# Patient Record
Sex: FEMALE | Race: WHITE | Employment: UNEMPLOYED | URBAN - NONMETROPOLITAN AREA
[De-identification: names, ages, dates, MRNs, and addresses within clinical notes are randomized per-mention and may not be internally consistent; named-entity substitution may affect disease eponyms.]

---

## 2020-03-30 ENCOUNTER — APPOINTMENT (OUTPATIENT)
Dept: GENERAL RADIOLOGY | Age: 35
End: 2020-03-30
Payer: MEDICAID

## 2020-03-30 ENCOUNTER — HOSPITAL ENCOUNTER (EMERGENCY)
Age: 35
Discharge: HOME | End: 2020-03-30
Attending: EMERGENCY MEDICINE
Payer: MEDICAID

## 2020-03-30 VITALS
BODY MASS INDEX: 20.4 KG/M2 | HEART RATE: 72 BPM | HEIGHT: 67 IN | DIASTOLIC BLOOD PRESSURE: 97 MMHG | TEMPERATURE: 98.7 F | WEIGHT: 130 LBS | OXYGEN SATURATION: 100 % | SYSTOLIC BLOOD PRESSURE: 131 MMHG

## 2020-03-30 PROCEDURE — 2709999900 HC NON-CHARGEABLE SUPPLY

## 2020-03-30 PROCEDURE — 73030 X-RAY EXAM OF SHOULDER: CPT

## 2020-03-30 PROCEDURE — 99284 EMERGENCY DEPT VISIT MOD MDM: CPT

## 2020-03-30 RX ORDER — HYDROCODONE BITARTRATE AND ACETAMINOPHEN 5; 325 MG/1; MG/1
1 TABLET ORAL EVERY 6 HOURS PRN
Qty: 10 TABLET | Refills: 0 | Status: SHIPPED | OUTPATIENT
Start: 2020-03-30 | End: 2020-04-04

## 2020-03-30 SDOH — HEALTH STABILITY: MENTAL HEALTH: HOW OFTEN DO YOU HAVE A DRINK CONTAINING ALCOHOL?: NEVER

## 2020-03-30 ASSESSMENT — PAIN DESCRIPTION - LOCATION: LOCATION: SHOULDER

## 2020-03-30 ASSESSMENT — PAIN SCALES - GENERAL: PAINLEVEL_OUTOF10: 10

## 2020-03-30 NOTE — ED NOTES
Discharge instructions reviewed with patient. Copy of instructions, along with prescription given to patient. Sling placed on patient prior to departure.         Derrek Mcneal RN  03/30/20 [id number]

## 2020-04-21 ENCOUNTER — HOSPITAL ENCOUNTER (OUTPATIENT)
Age: 35
Discharge: HOME | End: 2020-04-21
Payer: MEDICAID

## 2020-04-21 LAB
T4 FREE: 1.59 NG/DL (ref 0.93–1.76)
TSH SERPL DL<=0.05 MIU/L-ACNC: 1.63 UIU/ML (ref 0.4–4.2)

## 2020-04-21 PROCEDURE — 84443 ASSAY THYROID STIM HORMONE: CPT

## 2020-04-21 PROCEDURE — 84439 ASSAY OF FREE THYROXINE: CPT

## 2020-04-21 PROCEDURE — 36415 COLL VENOUS BLD VENIPUNCTURE: CPT

## 2020-04-27 ENCOUNTER — HOSPITAL ENCOUNTER (OUTPATIENT)
Dept: ULTRASOUND IMAGING | Age: 35
Discharge: HOME | End: 2020-04-27
Payer: MEDICAID

## 2020-04-27 ENCOUNTER — HOSPITAL ENCOUNTER (OUTPATIENT)
Dept: OCCUPATIONAL THERAPY | Age: 35
Setting detail: THERAPIES SERIES
Discharge: HOME | End: 2020-04-27
Payer: MEDICAID

## 2020-04-27 PROCEDURE — 76536 US EXAM OF HEAD AND NECK: CPT

## 2020-04-27 PROCEDURE — 97165 OT EVAL LOW COMPLEX 30 MIN: CPT

## 2020-04-27 ASSESSMENT — PAIN DESCRIPTION - ORIENTATION: ORIENTATION: RIGHT

## 2020-04-27 ASSESSMENT — PAIN DESCRIPTION - DESCRIPTORS: DESCRIPTORS: ACHING

## 2020-04-27 ASSESSMENT — PAIN DESCRIPTION - LOCATION: LOCATION: SHOULDER

## 2020-04-27 ASSESSMENT — PAIN SCALES - GENERAL: PAINLEVEL_OUTOF10: 8

## 2020-04-27 NOTE — FLOWSHEET NOTE
Status: Impaired  Additional Comments: Reports that she has some tingling in her right fingers at times when she is laying down. Hand Dominance: Right                    RUE PROM (degrees)  RUE PROM: Exceptions  R Shoulder Flex  0-180: 85  R Shoulder ABduction 0-180: 74  R Shoulder Ext Rotation  0-90: 65 at side  RUE AROM (degrees)  RUE AROM : Exceptions  R Shoulder Flexion 0-180: 62  R Shoulder ABduction 0-180: 56  R Shoulder Int Rotation  0-70: can get right thumb 6\" above waistband behind back  R Shoulder Ext Rotation 0-90: 55 at side                                                                                                      ADL  Additional Comments: Reports that she wakes up more times at night than she can count due to right shoulder pain. Relates extreme difficulty with dressing, hair care, household activities. Relates that she is not able to do laundry due to right shoulder pain and discomfort. Activity Tolerance: Additional Comments: Tolerated evaluation well     Assessment:  Performance deficits / Impairments: Decreased ADL status, Decreased ROM, Decreased strength, Decreased high-level IADLs  Assessment: Patient meets criteria for low complexity evaluation based on documented evaluation findings. Patient requires skilled therapy services to address ROM, functional strength, and functional use of right UE in her daily tasks to increase her ability to complete her normal daily tasks and work tasks. Prognosis: Good  Clinical Decision Making: Clinical Decision making was of Low Complexity as the result of analysis of data from a problem focused assessment, a consideration of a limited number of treatment options, no significant comorbidities affecting the plan of care and no modification or assistance required to complete the evaluation.     Patient Education:  Patient Education: OT POC, HEP - scapular retraction, backward shoulder Complexity: Based on the findings of patient history, examination, clinical presentation, and decision making during this evaluation, this patient is of low complexity.     Cody Coronado, OTR/L #[id number]

## 2020-05-01 ENCOUNTER — HOSPITAL ENCOUNTER (OUTPATIENT)
Dept: OCCUPATIONAL THERAPY | Age: 35
Setting detail: THERAPIES SERIES
Discharge: HOME | End: 2020-05-01
Payer: MEDICARE

## 2020-05-01 PROCEDURE — 97110 THERAPEUTIC EXERCISES: CPT

## 2020-05-01 ASSESSMENT — PAIN SCALES - GENERAL: PAINLEVEL_OUTOF10: 5

## 2020-05-01 ASSESSMENT — PAIN DESCRIPTION - LOCATION: LOCATION: SHOULDER

## 2020-05-01 ASSESSMENT — PAIN DESCRIPTION - ORIENTATION: ORIENTATION: RIGHT

## 2020-05-01 NOTE — PROGRESS NOTES
Plan:  Plan Comment: Continue per established POC  Specific instructions for Next Treatment: PROM, PAULO OLSON, OTR/L #[id number]

## 2020-05-04 ENCOUNTER — HOSPITAL ENCOUNTER (OUTPATIENT)
Dept: OCCUPATIONAL THERAPY | Age: 35
Setting detail: THERAPIES SERIES
Discharge: HOME | End: 2020-05-04
Payer: MEDICARE

## 2020-05-04 PROCEDURE — 97110 THERAPEUTIC EXERCISES: CPT

## 2020-05-04 ASSESSMENT — PAIN DESCRIPTION - LOCATION: LOCATION: SHOULDER

## 2020-05-04 ASSESSMENT — PAIN DESCRIPTION - ORIENTATION: ORIENTATION: RIGHT

## 2020-05-04 ASSESSMENT — PAIN SCALES - GENERAL: PAINLEVEL_OUTOF10: 5

## 2020-05-06 ENCOUNTER — HOSPITAL ENCOUNTER (OUTPATIENT)
Dept: OCCUPATIONAL THERAPY | Age: 35
Setting detail: THERAPIES SERIES
Discharge: HOME | End: 2020-05-06
Payer: MEDICARE

## 2020-05-06 PROCEDURE — 97110 THERAPEUTIC EXERCISES: CPT

## 2020-05-06 ASSESSMENT — PAIN SCALES - GENERAL: PAINLEVEL_OUTOF10: 5

## 2020-05-06 NOTE — PROGRESS NOTES
[facility]  OUTPATIENT OCCUPATIONAL THERAPY  Daily Note  [facility]    Time In: 1000  Time Out: 1030  Minutes: 30  Timed Code Treatment Minutes: 30 Minutes                Date: 2020  Patient Name: Laura Holliday        CSN: [id number]   : 1985  (36 y.o.)  Gender: female   Referring Practitioner: Dr. Kiley Pierce  Diagnosis: Unspecified dislocation of right acromioclavicular joint S43.101D          General:  OT Visit Information  Onset Date: 20  OT Insurance Information: [facility] Medicaid (starting 20); 30 visits per calendar year  Total # of Visits Approved: 31  Total # of Visits to Date: 4  Certification Period Expiration Date: 20  Progress Note Counter: 4/10 for PN  Comments: returns to physician         Restrictions/Precautions:       Position Activity Restriction  Other position/activity restrictions: anxiety; was told to not do any lifting with right UE         Subjective:  Subjective: States that her shoulder is \"pretty sore\" today. States that she thinks that the mobility in her arm is getting better. Pain:  Patient Currently in Pain: Yes  Pain Assessment: 0-10  Pain Level: 5(5/10 in right shoulder; 6/10 in right scapular area)       Objective:     Upper Extremity Function  UE AROM: scapular retraction and backward shoulder circles x 15 reps each  UE PROM: pulleys for shoulder flexion x 20 reps; supine PROM to right shoulder in all planes to patient tolerance  UE AAROM: seated saeboglide for right shoulder flexion x 20 reps, shoulder scaption x 20 reps; supine bilateral shoulder flexion x 10 reps  UE Stretching: STM to right rhomboid region with tightness present                             Additional Activities Comment  Additional Activities: Rock tape applied to right rhomboid region - 2 X strips placed across each other for rhomoid inhibition                  Activity Tolerance: Additional Comments:  Tolerated treatment

## 2020-05-11 ENCOUNTER — HOSPITAL ENCOUNTER (OUTPATIENT)
Dept: OCCUPATIONAL THERAPY | Age: 35
Setting detail: THERAPIES SERIES
Discharge: HOME | End: 2020-05-11
Payer: MEDICARE

## 2020-05-11 PROCEDURE — 97110 THERAPEUTIC EXERCISES: CPT

## 2020-05-11 ASSESSMENT — PAIN DESCRIPTION - LOCATION: LOCATION: SHOULDER

## 2020-05-11 ASSESSMENT — PAIN DESCRIPTION - ORIENTATION: ORIENTATION: RIGHT

## 2020-05-11 ASSESSMENT — PAIN SCALES - GENERAL: PAINLEVEL_OUTOF10: 4

## 2020-05-11 NOTE — PROGRESS NOTES
X strips placed across each other for rhomoid inhibition; Y strip put on upper trapezius for muscle inhibition                  Activity Tolerance: Additional Comments:  Tolerated treatment well    Assessment:  Assessment: Progressing toward goals    Patient Education:  Patient Education: discussed with patient to speak with physician about how physical her job duties are            Plan:  Plan Comment: Continue per established POC  Specific instructions for Next Treatment: PROM, AROM, PAULO Mccauley, OTR/L #[id number]

## 2020-05-13 ENCOUNTER — HOSPITAL ENCOUNTER (OUTPATIENT)
Dept: OCCUPATIONAL THERAPY | Age: 35
Setting detail: THERAPIES SERIES
Discharge: HOME | End: 2020-05-13
Payer: MEDICARE

## 2020-05-13 PROCEDURE — 97110 THERAPEUTIC EXERCISES: CPT

## 2020-05-13 ASSESSMENT — PAIN DESCRIPTION - ORIENTATION: ORIENTATION: RIGHT

## 2020-05-13 ASSESSMENT — PAIN SCALES - GENERAL: PAINLEVEL_OUTOF10: 4

## 2020-05-13 ASSESSMENT — PAIN DESCRIPTION - LOCATION: LOCATION: SHOULDER

## 2020-05-18 ENCOUNTER — HOSPITAL ENCOUNTER (OUTPATIENT)
Dept: OCCUPATIONAL THERAPY | Age: 35
Setting detail: THERAPIES SERIES
Discharge: HOME | End: 2020-05-18
Payer: MEDICARE

## 2020-05-18 PROCEDURE — 97110 THERAPEUTIC EXERCISES: CPT

## 2020-05-18 ASSESSMENT — PAIN SCALES - GENERAL: PAINLEVEL_OUTOF10: 4

## 2020-05-18 ASSESSMENT — PAIN DESCRIPTION - LOCATION: LOCATION: SHOULDER

## 2020-05-18 ASSESSMENT — PAIN DESCRIPTION - ORIENTATION: ORIENTATION: RIGHT

## 2020-05-18 NOTE — PROGRESS NOTES
[facility]  OUTPATIENT OCCUPATIONAL THERAPY  Daily Note  [facility]    Time In: 336  Time Out: 1640  Minutes: 55  Timed Code Treatment Minutes: 40 Minutes                Date: 2020  Patient Name: Gloria Gómez        CSN: [id number]   : 1985  (36 y.o.)  Gender: female   Referring Practitioner: Dr. Agustin Griffith  Diagnosis: Unspecified dislocation of right acromioclavicular joint S43.101D          General:  OT Visit Information  Onset Date: 20  OT Insurance Information: [facility] Medicaid (starting 20); 30 visits per calendar year  Total # of Visits Approved: 31  Total # of Visits to Date: 7  Certification Period Expiration Date: 20  Progress Note Counter: 7/10 for PN  Comments: returns to physician        Restrictions/Precautions:       Position Activity Restriction  Other position/activity restrictions: anxiety; no lifting more than 10#          Subjective:  Subjective: Reports that she has had a migraine for the past 3 days, and will try to make it for 45 minutes today. Reported that she was not sure if she could tolerate today. Reports increased pain with performance of her HEP.             Pain:  Patient Currently in Pain: Yes  Pain Assessment: 0-10(10 for migraine)  Pain Level: 4  Pain Location: Shoulder  Pain Orientation: Right       Objective:     Upper Extremity Function  UE AROM: sidelying scapular depression 10 reps - cues to avoid compensation   UE PROM: supine PROM all planes with tightness in IR only  UE Stretching: STM to right upper trapezius region with high amount of tightness present; sidelying scapular depression stretch; lying in supine over towel roll with over pressure from therapist to decrease pec tightness with increased IR noted after  UE Strengthing: orange band extension with cues for technique to avoid compensation, cues for ER and IR theraband exercises         Additional Activities Comment  Additional Activities: Usentric tape applied to right rhomboid region - Y strip put on upper trapezius for muscle inhibition; donuts over AC joint to decrease pain reported       Moist Heat  Number Minutes Moist Heat: MHP to the right upper trap to decrease great tightness and pain noted          Activity Tolerance: Additional Comments: Tolerated treatment well    Assessment:  Assessment: Progressing toward goals. Reported decreased pain at the end of the session.       Patient Education:  Patient Education: upper trap stretch, pec stretch over towel roll, sidelying depression prior to theraband to activate correct scapular muscles            Plan:  Current Treatment Recommendations: Strengthening, ROM, Self-Care / ADL, Patient/Caregiver Education & Training  Plan Comment: Continue per established POC  Specific instructions for Next Treatment: PROM, AROM, AAROM    Del Arreaga, MOT, OTR/L [id number]

## 2020-05-20 ENCOUNTER — HOSPITAL ENCOUNTER (OUTPATIENT)
Dept: OCCUPATIONAL THERAPY | Age: 35
Setting detail: THERAPIES SERIES
Discharge: HOME | End: 2020-05-20
Payer: MEDICARE

## 2020-05-20 PROCEDURE — 97110 THERAPEUTIC EXERCISES: CPT

## 2020-05-20 ASSESSMENT — PAIN DESCRIPTION - ORIENTATION: ORIENTATION: RIGHT

## 2020-05-20 ASSESSMENT — PAIN DESCRIPTION - LOCATION: LOCATION: SHOULDER

## 2020-05-20 ASSESSMENT — PAIN SCALES - GENERAL: PAINLEVEL_OUTOF10: 4

## 2020-05-27 ENCOUNTER — HOSPITAL ENCOUNTER (OUTPATIENT)
Dept: OCCUPATIONAL THERAPY | Age: 35
Setting detail: THERAPIES SERIES
Discharge: HOME | End: 2020-05-27
Payer: MEDICARE

## 2020-05-27 PROCEDURE — 97110 THERAPEUTIC EXERCISES: CPT

## 2020-05-27 ASSESSMENT — PAIN DESCRIPTION - ORIENTATION: ORIENTATION: RIGHT

## 2020-05-27 ASSESSMENT — PAIN DESCRIPTION - LOCATION: LOCATION: SHOULDER

## 2020-05-27 NOTE — PROGRESS NOTES
theraband for HEP            Plan:  Plan Comment: Continue per established POC  Specific instructions for Next Treatment: AROM, joseph Dueñas, OTR/L #[id number]

## 2020-05-29 ENCOUNTER — HOSPITAL ENCOUNTER (OUTPATIENT)
Dept: OCCUPATIONAL THERAPY | Age: 35
Setting detail: THERAPIES SERIES
Discharge: HOME | End: 2020-05-29
Payer: MEDICARE

## 2020-05-29 PROCEDURE — 97110 THERAPEUTIC EXERCISES: CPT

## 2020-05-29 ASSESSMENT — PAIN SCALES - GENERAL: PAINLEVEL_OUTOF10: 2

## 2020-05-29 ASSESSMENT — PAIN DESCRIPTION - ORIENTATION: ORIENTATION: RIGHT

## 2020-05-29 ASSESSMENT — PAIN DESCRIPTION - LOCATION: LOCATION: SHOULDER

## 2020-06-01 ENCOUNTER — HOSPITAL ENCOUNTER (OUTPATIENT)
Dept: OCCUPATIONAL THERAPY | Age: 35
Setting detail: THERAPIES SERIES
Discharge: HOME | End: 2020-06-01
Payer: MEDICARE

## 2020-06-01 PROCEDURE — 97110 THERAPEUTIC EXERCISES: CPT

## 2020-06-01 ASSESSMENT — PAIN SCALES - GENERAL: PAINLEVEL_OUTOF10: 3

## 2020-06-01 ASSESSMENT — PAIN DESCRIPTION - ORIENTATION: ORIENTATION: RIGHT

## 2020-06-01 ASSESSMENT — PAIN DESCRIPTION - LOCATION: LOCATION: SHOULDER

## 2020-06-01 NOTE — PROGRESS NOTES
[address] [address]  OUTPATIENT OCCUPATIONAL THERAPY  Daily Note  [facility]    Time In: 1500  Time Out: 1535  Minutes: 35  Timed Code Treatment Minutes: 35 Minutes                Date: 2020  Patient Name: Serene Castro        CSN: [id number]   : 1985  (36 y.o.)  Gender: female   Referring Practitioner: Dr. Yong Goldberg  Diagnosis: Unspecified dislocation of right acromioclavicular joint S43.101D          General:  OT Visit Information  Onset Date: 20  OT Insurance Information: [employer] Medicaid (starting 20); 30 visits per calendar year  Total # of Visits Approved: 31  Total # of Visits to Date: 6  Certification Period Expiration Date: 20  Progress Note Counter: PN completed on , 1/10 for PN  Comments: returns to physician        Restrictions/Precautions:       Position Activity Restriction  Other position/activity restrictions: anxiety; no lifting more than 10#          Subjective:  Subjective: Reports that she is sore on the front of the shoulder. Pain:  Patient Currently in Pain: Yes(2/10 at time of therapy; 4/10 at highest in last couple of days and states that it was at the end of the day and she was doing HEP)  Pain Assessment: 0-10  Pain Level: 3  Pain Location: Shoulder  Pain Orientation: Right       Objective:     Upper Extremity Function  UE PROM: supine PROM with tightness with IR - ROM for IR increased after pec and sleeper stretch  UE Stretching: sleeper stretch and pec minor stretch with increased IR and decreased pain anterior shoulder; pulleys to warm up  UE Strengthing: Hipui at 60 speed x 3 minutes forward and 3 minutes backward; prone hughstons with right shoulder (no weight) - horizontal abduction with palm down and thumb up x 10 reps each, scaption with palm down and thumb up x 10 reps up   Decreased height of movement with prone scaption thumb up to avoid pain. Activity Tolerance:  Tolerated treatment well, no pain

## 2020-06-05 ENCOUNTER — HOSPITAL ENCOUNTER (OUTPATIENT)
Dept: OCCUPATIONAL THERAPY | Age: 35
Setting detail: THERAPIES SERIES
End: 2020-06-05
Payer: MEDICARE

## 2020-06-08 ENCOUNTER — HOSPITAL ENCOUNTER (OUTPATIENT)
Dept: OCCUPATIONAL THERAPY | Age: 35
Setting detail: THERAPIES SERIES
Discharge: HOME | End: 2020-06-08
Payer: MEDICARE

## 2020-06-08 PROCEDURE — 97110 THERAPEUTIC EXERCISES: CPT

## 2020-06-08 ASSESSMENT — PAIN SCALES - GENERAL: PAINLEVEL_OUTOF10: 2

## 2020-06-08 ASSESSMENT — PAIN DESCRIPTION - LOCATION: LOCATION: SHOULDER

## 2020-06-08 ASSESSMENT — PAIN DESCRIPTION - ORIENTATION: ORIENTATION: RIGHT

## 2020-06-08 NOTE — PROGRESS NOTES
[address] [address]  OUTPATIENT OCCUPATIONAL THERAPY  Daily Note  [facility]    Time In: 6985  Time Out: 1600  Minutes: 30  Timed Code Treatment Minutes: 30 Minutes                Date: 2020  Patient Name: Zachary Zacarias        CSN: [id number]   : 1985  (36 y.o.)  Gender: female   Referring Practitioner: Dr. René Vázquez  Diagnosis: Unspecified dislocation of right acromioclavicular joint S43.101D          General:  OT Visit Information  Onset Date: 20  OT Insurance Information: [employer] (starting 20); 30 visits per calendar year  Total # of Visits Approved: 31  Total # of Visits to Date: 12  Certification Period Expiration Date: 20  Progress Note Counter: PN completed on , 2/10 for PN  Comments: returns to physician        Restrictions/Precautions:       Position Activity Restriction  Other position/activity restrictions: anxiety; no lifting more than 10#          Subjective:  Subjective: States that she does feel as though she has made quite a progress, but she doesn't think that she is strong enough to return to her normal work duties. Pain:  Patient Currently in Pain: Yes(2/10 at time of therapy; 4/10 at highest in last couple of days and states that it was at the end of the day and she was doing HEP)  Pain Assessment: 0-10  Pain Level: 2  Pain Location: Shoulder  Pain Orientation: Right       Objective:     Upper Extremity Function  UE Strengthing: lime green theraband- right shoulder horizontal abduction/adduction x 15 reps, overhead throw x 15 reps, right shoulder abduction with band under foot x 15 reps; blue theraband - seated riivalid x 15 reps in each direction, fanny x 15 reps with right UE; biodex at 50 speed x 3 minutes forward and 3 minutes backward                                                Activity Tolerance: Additional Comments: Tolerated treatment well    Assessment:  Assessment: Progressing toward goals nicely. Strength continues to be concern for patient to return to work.     Patient Education:  Patient Education: to use blue theraband for fanny and riivalid; lime green theraband - overhead, horizontal abduction/adduction, shoulder abduction; to use 0.5# weight with hughston exercises            Plan:  Plan Comment: Continue with current POC  Specific instructions for Next Treatment: strengthening                   Mckayla Frias, OTR/L #[id number]

## 2020-06-10 ENCOUNTER — HOSPITAL ENCOUNTER (OUTPATIENT)
Dept: OCCUPATIONAL THERAPY | Age: 35
Setting detail: THERAPIES SERIES
Discharge: HOME | End: 2020-06-10
Payer: MEDICARE

## 2020-06-10 PROCEDURE — 97110 THERAPEUTIC EXERCISES: CPT

## 2020-06-10 NOTE — DISCHARGE SUMMARY
patient is able to complete the HEP and advance it appropriately and safely. Patient has demonstrated proper understanding of how to perform safe lifting with right UE. Patient Education:  Patient Education: to advance to blue theraband for all exericses as able; to add weight to hughston exercises, planks for right scapular strengthening, work simulation for bilateral UE lifting            Plan:  Plan Comment: Discharge from therapy as patient has met goals and has been released to return to work with restrictions    Patient goals : To get general use back in my arm. To be able to lift things comfortably. Short term goals  Time Frame for Short term goals: STG NOT FORMULATED DUE TO SHORT ELOS - SEE LTG FOR DETAILS  Long term goals  Time Frame for Long term goals : 4 weeks  Long term goal 1: Be independent with HEP as instructed to increase her eventual ability to complete work tasks. GOAL MET  Long term goal 2: Be able to use right arm to move bag of groceries from cart to car without pain. GOAL MET  Long term goal 3: Be able to retrieve gallon milk from refrigerator with right UE without pain. GOAL MET  Long term goal 4: Report being able to sleep through night without waking due to shoulder pain. GOAL NOT MET - RELATES THAT HER SHOULDER DOES WAKE HER UP, BUT VERY RARELY  Long term goal 5: Increase MMT of right shoulder to at least 4+/5 for all motions to allow patient to be able to complete work related lifting tasks (has to lift at least 50# at work). GOAL MET - SEE OBJECTIVE SECTION OF NOTE FOR DETAILS    UEFS Score: 91.25    Kim Butt, OTR/L #[id number]

## 2024-08-08 NOTE — PROGRESS NOTES
DISCONTINUED  Short term goal 5: Report pain going no higher than 5/10 in right shoulder at any time to assist in improving sleep pattern. GOAL MET - GOAL DISCONTINUED  Long term goals  Time Frame for Long term goals : 4 weeks  Long term goal 1: Be able to complete hair care without difficulty and without pain in right shoulder. GOAL MET - REVISED GOAL: Be independent with HEP as instructed to increase her eventual ability to complete work tasks. Long term goal 2: Report being able to turn steering wheel without difficulty or pain in right shoulder. GOAL MET - REVISED GOAL; Be able to use right arm to move bag of groceries from cart to car without pain. Long term goal 3: Be able to retrieve gallon milk from refrigerator with right UE without pain. GOAL NOT MET - REPORTS PAIN AT 5/10 WITH THIS ACTIVITY - CONTINUE GOAL  Long term goal 4: Report being able to sleep through night without waking due to shoulder pain. GOAL NOT MET - RELATES THAT SHE IS WAKING 1 X PER NIGHT A FEW TIMES A WEEK - CONTINUE GOAL  Long term goal 5: GOAL INTIATED: Increase MMT of right shoulder to at least 4+/5 for all motions to allow patient to be able to complete work related lifting tasks (has to lift at least 50# at work).          Colten Fuentes, OTR/L #[id number] Quality 130: Documentation Of Current Medications In The Medical Record: Current Medications Documented Detail Level: Detailed Quality 226: Preventive Care And Screening: Tobacco Use: Screening And Cessation Intervention: Patient screened for tobacco use and is an ex/non-smoker